# Patient Record
Sex: FEMALE | Race: BLACK OR AFRICAN AMERICAN | Employment: FULL TIME | URBAN - METROPOLITAN AREA
[De-identification: names, ages, dates, MRNs, and addresses within clinical notes are randomized per-mention and may not be internally consistent; named-entity substitution may affect disease eponyms.]

---

## 2020-05-29 ENCOUNTER — TELEPHONE (OUTPATIENT)
Dept: OBGYN CLINIC | Age: 35
End: 2020-05-29

## 2020-05-29 NOTE — TELEPHONE ENCOUNTER
I would probably advise an US no earlier than 1-2 weeks from now based on her possible LMP of 4/23, if we do it too early we wont be able to see the baby/heartbeat. If you find me an ultrasound Ill make it work on my schedule. You can use the late am work in spots    Or add on to the end of the day on Tues/Thur 240-340    Also if she wants to just do a pregnancy consult to discuss her risk factors/labs (no US)   6/1 840 arrive 825/15min early for paperwork if new  or 1110    Let me know if you need help finding a better time for her.

## 2020-05-29 NOTE — TELEPHONE ENCOUNTER
This message is coming from a nurse triage call that was received regarding a new patient that is set up to see TP on 6/23/20 for NOB. She has seen her PCP and had some blood work done. She has had a history of some vitamin deficiency due to surgery that she had similar to a gastric bypass a while back. She is not having any pregnancy issues such as cramping or bleeding. She just does not know exactly how far along she is. She said either her last period was on 3/2/20 or 4/23/20. She does not know exactly because the one on 3-2-20 was very irregular. She has slight nausea and dizziness at times but is tolerating well. She has had to utilize MFM in the past according to patient due pregnancy issues. She does not want to wait until 6-23-20 for her NOB and wants to be seen sooner. Please advise your recommendations, as nothing she verbalized  has indicated an urgency. Patient has been advised that TP is out of office today and may not get message back until next week and she is good with this.

## 2020-06-01 ENCOUNTER — OFFICE VISIT (OUTPATIENT)
Dept: OBGYN CLINIC | Age: 35
End: 2020-06-01

## 2020-06-01 VITALS
WEIGHT: 202.5 LBS | HEIGHT: 66 IN | SYSTOLIC BLOOD PRESSURE: 100 MMHG | BODY MASS INDEX: 32.54 KG/M2 | DIASTOLIC BLOOD PRESSURE: 68 MMHG

## 2020-06-01 DIAGNOSIS — Z30.09 FAMILY PLANNING: ICD-10-CM

## 2020-06-01 DIAGNOSIS — E55.9 VITAMIN D DEFICIENCY: ICD-10-CM

## 2020-06-01 DIAGNOSIS — N92.6 MISSED MENSES: Primary | ICD-10-CM

## 2020-06-01 DIAGNOSIS — Z98.891 H/O: C-SECTION: ICD-10-CM

## 2020-06-01 LAB
HCG URINE, QL. (POC): POSITIVE
VALID INTERNAL CONTROL?: YES

## 2020-06-01 RX ORDER — ASPIRIN 325 MG
TABLET, DELAYED RELEASE (ENTERIC COATED) ORAL
COMMUNITY

## 2020-06-01 RX ORDER — CALCIUM CARBONATE/VITAMIN D3 600 MG-125
TABLET ORAL
COMMUNITY

## 2020-06-01 RX ORDER — CHOLECALCIFEROL (VITAMIN D3) 50 MCG
CAPSULE ORAL
COMMUNITY

## 2020-06-01 NOTE — PROGRESS NOTES
[address] [facility]  [email or web address]  [phone]    Alfonzo Apple MD, [address] [facility]       OB/GYN Problem visit    Chief Complaint:   Chief Complaint   Patient presents with    Missed Menses     Tested positive for pregnancy       Last or next WWE is: Due now    History of Present Illness: This is a new problem being evaluated by this provider. The patient is a 37 y.o.  female who reports having tested positive for pregnancy 2 weeks ago. She reports the symptoms are unchanged. Aggravating factors include none. Alleviating factors include none. Planning on seeing nutritionist.       She does have other concerns. Patient reports she has had issues with vitamin D and vitamin A deficiency. LMP: Patient's last menstrual period was 2020 (exact date). PFSH:  Past Medical History:   Diagnosis Date    Anemia     Anemia     Gallstones     Hypoglycemia     Migraines     Pap smear for cervical cancer screening     Vitamin A deficiency     Vitamin D deficiency      Past Surgical History:   Procedure Laterality Date    HX  SECTION  2016;07/15/2009    HX HERNIA REPAIR      IR CHOLECYSTOSTOMY PERCUTANEOUS      [facility]     Family History   Problem Relation Age of Onset   [address] [facility] Hypertension Mother    [address] [facility] MS Mother     Stroke Mother     Hypertension Father     COPD Father     Diabetes Father     Breast Cancer Paternal Aunt      Social History     Tobacco Use    Smoking status: Never Smoker    Smokeless tobacco: Never Used   Substance Use Topics    Alcohol use: Not Currently    Drug use: Never     Allergies   Allergen Reactions    Tamiflu [Oseltamivir] Hives     Current Outpatient Medications   Medication Sig    cholecalciferol (VITAMIN D3) (50,000 UNITS /1250 MCG) capsule Take  by mouth every seven (7) days.  MULTIVITAMIN PO Take  by mouth.     calcium-cholecalciferol, d3, (CALCIUM 600 + D) 600-125 mg-unit tab Take by mouth.  omega 3-dha-epa-fish oil (Fish Oil) 100-160-1,000 mg cap Take  by mouth. No current facility-administered medications for this visit. Review of Systems:  History obtained from the patient  Constitutional: negative for fevers, chills and weight loss  ENT ROS: negative for - hearing change, oral lesions or visual changes  Respiratory: negative for cough, wheezing or dyspnea on exertion  Cardiovascular: negative for chest pain, irregular heart beats, exertional chest pressure/discomfort  Gastrointestinal: negative for dysphagia, nausea and vomiting  Genito-Urinary ROS:  see HPI  Inteument/breast: negative for rash, breast lump and nipple discharge  Musculoskeletal:negative for stiff joints, neck pain and muscle weakness  Endocrine ROS: negative for - breast changes, galactorrhea or temperature intolerance  Hematological and Lymphatic ROS: negative for - blood clots, bruising or swollen lymph nodes    Physical Exam:  Visit Vitals  /68 (BP 1 Location: Left arm, BP Patient Position: Sitting)   Ht 5' 6\" (1.676 m)   Wt 202 lb 8 oz (91.9 kg)   BMI 32.68 kg/m²       GENERAL: alert, well appearing, and in no distress  HEAD: normocephalic, atraumatic. \\  NEURO: alert, oriented, normal speech    Assessment:  Encounter Diagnoses   Name Primary?  Missed menses Yes    Family planning     Vitamin D deficiency     H/O:         Plan:  The patient is advised that she should contact the office if she does not note improvement or if symptoms recur  Recommend follow up with PCP for non-gynecologic complaints and chronic medical problems.     She should contact our office with any questions or concerns  Get records, CS/ prior ob records  Rec PNV  Had labs with PCP vit/a/e/hcg  Consider earlier EOB us if indicated  Agree with planned nutrition consult  We discussed risks of AMA: including but not limited to the patient's risk for adverse outcome, including miscarriage, pregnancy loss, stillbirth, fetal chromosomal and anatomic anomalies,  delivery, low birth weight, intrauterine growth restriction, placenta previa, gestational diabetes, preeclampsia, and  delivery. I recommended a genetics and MFM consult to review genetic and OB risks in detail. We reviewed the JAYA delivery consent and it was signed by the patient. We discussed risks and alternatives to trial of labor after  section, including elective repeat  section, ERCD. We also discussed potential risks of pitocin use, including but not limited to uterine rupture. We discussed bleeding, infection, anesthesia risks, damage to internal organs; bladder/bowel/other internal organs, scarring, and additional procedures, if needed. We discussed that blood transfusion may be required in life threatening situations and discussed alternatives. We reviewed potential risks of blood transfusion: including transfusion reactions and infections. The patient consents to transfusion, if necessary. We reviewed success and failure rates of TOLAC, incidence of uterine rupture and factors that increase risks, future reproductive plans and risks of multiple  deliveries, including but not limited to placenta creta. The maternal and  risks with uterine rupture were reviewed. We discussed  risks associated with TOLAC and ERCD. The risk of peripartum hysterectomy because of uterine rupture or placenta creta, in future pregnancies were reviewed. Would need to review op notes to consider GULSHAN and rec avoid IOL     I spent 25 minutes of face to face time counseling and discussing pregnancy/prenatal care medical risk factors with the patient. More than 50 % of her visit was spent performing counseling.          Orders Placed This Encounter    AMB POC URINE PREGNANCY TEST, VISUAL COLOR COMPARISON       Results for orders placed or performed in visit on 20   AMB POC URINE PREGNANCY TEST, VISUAL COLOR COMPARISON   Result Value Ref Range    VALID INTERNAL CONTROL POC Yes     HCG urine, Ql. (POC) Positive Negative

## 2020-06-01 NOTE — PATIENT INSTRUCTIONS
Learning About Pregnancy Your Care Instructions Your health in the early weeks of your pregnancy is particularly important for your baby's health. Take good care of yourself. Anything you do that harms your body can also harm your baby. Make sure to go to all of your doctor appointments. Regular checkups will help keep you and your baby healthy. How can you care for yourself at home? Diet · Eat a balanced diet. Make sure your diet includes plenty of beans, peas, and leafy green vegetables. · Do not skip meals or go for many hours without eating. If you are nauseated, try to eat a small, healthy snack every 2 to 3 hours. · Do not eat fish that has a high level of mercury, such as shark, swordfish, or mackerel. Do not eat more than one can of tuna each week. · Drink plenty of fluids, enough so that your urine is light yellow or clear like water. If you have kidney, heart, or liver disease and have to limit fluids, talk with your doctor before you increase the amount of fluids you drink. · Cut down on caffeine, such as coffee, tea, and cola. · Do not drink alcohol, such as beer, wine, or hard liquor. · Take a multivitamin that contains at least 400 micrograms (mcg) of folic acid to help prevent birth defects. Fortified cereal and whole wheat bread are good additional sources of folic acid. · Increase the calcium in your diet. Try to drink a quart of skim milk each day. You may also take calcium supplements and choose foods such as cheese and yogurt. Lifestyle · Make sure you go to your follow-up appointments. · Get plenty of rest. You may be unusually tired while you are pregnant. · Get at least 30 minutes of exercise on most days of the week. Walking is a good choice. If you have not exercised in the past, start out slowly. Take several short walks each day. · Do not smoke.  If you need help quitting, talk to your doctor about stop-smoking programs. These can increase your chances of quitting for good. · Do not touch cat feces or litter boxes. Also, wash your hands after you handle raw meat, and fully cook all meat before you eat it. Wear gloves when you work in the yard or garden, and wash your hands well when you are done. Cat feces, raw or undercooked meat, and contaminated dirt can cause an infection that may harm your baby or lead to a miscarriage. · Do not use saunas or hot tubs. Raising your body temperature may harm your baby. · Avoid chemical fumes, paint fumes, or poisons. · Do not use illegal drugs or alcohol. Medicines · Review all of your medicines with your doctor. Some of your routine medicines may need to be changed to protect your baby. · Use acetaminophen (Tylenol) to relieve minor problems, such as a mild headache or backache or a mild fever with cold symptoms. Do not use nonsteroidal anti-inflammatory drugs (NSAIDs), such as ibuprofen (Advil, Motrin) or naproxen (Aleve), unless your doctor says it is okay. · Do not take two or more pain medicines at the same time unless the doctor told you to. Many pain medicines have acetaminophen, which is Tylenol. Too much acetaminophen (Tylenol) can be harmful. · Take your medicines exactly as prescribed. Call your doctor if you think you are having a problem with your medicine. To manage morning sickness · If you feel sick when you first wake up, try eating a small snack (such as crackers) before you get out of bed. Allow some time to digest the snack, and then get out of bed slowly. · Do not skip meals or go for long periods without eating. An empty stomach can make nausea worse. · Eat small, frequent meals instead of three large meals each day. · Drink plenty of fluids. Sports drinks, such as Gatorade or Powerade, are good choices. · Eat foods that are high in protein but low in fat.  
· If you are taking iron supplements, ask your doctor if they are necessary. Iron can make nausea worse. · Avoid any smells, such as coffee, that make you feel sick. · Get lots of rest. Morning sickness may be worse when you are tired. Follow-up care is a key part of your treatment and safety. Be sure to make and go to all appointments, and call your doctor if you are having problems. It's also a good idea to know your test results and keep a list of the medicines you take. Where can you learn more? Go to http://kourtney-tamy.info/ Enter A208 in the search box to learn more about \"Learning About Pregnancy. \" Current as of: February 11, 2020               Content Version: 12.5 © 4183-5226 Healthwise, Incorporated. Care instructions adapted under license by [employer] (which disclaims liability or warranty for this information). If you have questions about a medical condition or this instruction, always ask your healthcare professional. [address] any warranty or liability for your use of this information.

## 2020-06-05 ENCOUNTER — TELEPHONE (OUTPATIENT)
Dept: OBGYN CLINIC | Age: 35
End: 2020-06-05

## 2020-06-05 PROBLEM — Z98.891 H/O: C-SECTION: Status: ACTIVE | Noted: 2020-06-05

## 2020-06-05 NOTE — TELEPHONE ENCOUNTER
Labs look ok except vit D level is low: reccontinue supplementation I would advise at least 2000 IU per day and keep planned nutrition consult. I think that was her plan    No anemia, iron level ok.     Avi Gallardo MD

## 2020-06-05 NOTE — TELEPHONE ENCOUNTER
Patient is calling saying that she saw TP on 6/1/20. She has a history of high risk pregnancy. TP was requesting records/labs from her PCP [address])- records are scanned in media. Patient wants to know your thoughts on the labs. She has a history of anemia. She said she has NOB appointment with [address] [place] [address],[address] on 6/23/20  9:30 am US and visit with TP after at 10:00 am.  Is this what you wanted her to do or does she need to see MFM earlier?

## 2020-06-17 ENCOUNTER — PATIENT MESSAGE (OUTPATIENT)
Dept: OBGYN CLINIC | Age: 35
End: 2020-06-17

## 2020-06-17 RX ORDER — DOXYLAMINE SUCCINATE AND PYRIDOXINE HYDROCHLORIDE 20; 20 MG/1; MG/1
1 TABLET, EXTENDED RELEASE ORAL DAILY
Qty: 60 TAB | Refills: 3 | Status: SHIPPED | OUTPATIENT
Start: 2020-06-17 | End: 2020-06-18

## 2020-06-17 NOTE — TELEPHONE ENCOUNTER
From: Carl Orellana  To: aHlley Martell MD  Sent: 6/17/2020 8:17 AM EDT  Subject: Non-Urgent Medical Question    Good Morning,     I am still having issues with nausea and just not feeling well over all. I have tried the B6 and Unisom. I have also tried sandhya (which makes me gag now). I suppose it is just part of pregnancy. I am only concerned now because I also have diarrhea and wasn't sure if this was safe or not. Please advise. Thank you for your time.      Sincerely,   Johnathan Dent

## 2020-06-18 ENCOUNTER — PATIENT MESSAGE (OUTPATIENT)
Dept: OBGYN CLINIC | Age: 35
End: 2020-06-18

## 2020-06-19 RX ORDER — DOXYLAMINE SUCCINATE AND PYRIDOXINE HYDROCHLORIDE, DELAYED RELEASE TABLETS 10 MG/10 MG 10; 10 MG/1; MG/1
2 TABLET, DELAYED RELEASE ORAL
Qty: 100 TAB | Refills: 0 | Status: SHIPPED | OUTPATIENT
Start: 2020-06-19 | End: 2020-07-02

## 2020-06-19 NOTE — TELEPHONE ENCOUNTER
From: Nemo Lomas  To: Sarah Tyler MD  Sent: 6/18/2020 10:59 AM EDT  Subject: Prescription Question    Hi Dr. Panda Pittman,    My insurance won't pay for the prescription you sent. Is there an alternative that can be sent? Thanks in advance!

## 2020-06-22 NOTE — PATIENT INSTRUCTIONS
Learning About When to Call Your Doctor During Pregnancy (Up to 20 Weeks)  Your Care Instructions     It's common to have concerns about what might be a problem during pregnancy. Although most pregnant women don't have any serious problems, it's important to know when to call your doctor if you have certain symptoms. These are general suggestions. Your doctor may give you some more information about when to call. When to call your doctor (up to 20 weeks)  [id number] anytime you think you may need emergency care. For example, call if:  · You passed out (lost consciousness). Call your doctor now or seek immediate medical care if:  · You have a fever. · You have vaginal bleeding. · You are dizzy or lightheaded, or you feel like you may faint. · You have symptoms of a urinary tract infection. These may include:  ? Pain or burning when you urinate. ? A frequent need to urinate without being able to pass much urine. ? Pain in the flank, which is just below the rib cage and above the waist on either side of the back. ? Blood in your urine. · You have belly pain. · You think you are having contractions. · You have a sudden release of fluid from your vagina. Watch closely for changes in your health, and be sure to contact your doctor if:  · You have vaginal discharge that smells bad. · You have other concerns about your pregnancy. Follow-up care is a key part of your treatment and safety. Be sure to make and go to all appointments, and call your doctor if you are having problems. It's also a good idea to know your test results and keep a list of the medicines you take. Where can you learn more? Go to http://kourtney-tamy.info/  Enter B724 in the search box to learn more about \"Learning About When to Call Your Doctor During Pregnancy (Up to 20 Weeks). \"  Current as of: February 11, 2020               Content Version: 12.5  © 1972-7443 Healthwise, Incorporated.    Care instructions adapted under license by [employer] (which disclaims liability or warranty for this information). If you have questions about a medical condition or this instruction, always ask your healthcare professional. [address] any warranty or liability for your use of this information. Weeks 14 to 18 of Your Pregnancy: Care Instructions  Your Care Instructions    During this time, you may start to \"show,\" so that you look pregnant to people around you. You may also notice some changes in your skin, such as itchy spots on your palms or acne on your face. Your baby is now able to pass urine, and your baby's first stool (meconium) is starting to collect in his or her intestines. Hair is also beginning to grow on your baby's head. At your next visit, between weeks 18 and 20, your doctor may do an ultrasound test. The test allows your doctor to check for certain problems. Your doctor can also tell the sex of your baby. This is a good time to think about whether you want to know whether your baby is a boy or a girl. Talk to your doctor about getting a flu shot to help keep you healthy during your pregnancy. As your pregnancy moves along, it is common to worry or feel anxious. Your body is changing a lot. And you are thinking about giving birth, the health of your baby, and becoming a parent. You can learn to cope with any anxiety and stress you feel. Follow-up care is a key part of your treatment and safety. Be sure to make and go to all appointments, and call your doctor if you are having problems. It's also a good idea to know your test results and keep a list of the medicines you take. How can you care for yourself at home?   Reduce stress    · Ask for help with cooking and housekeeping.     · Figure out who or what causes your stress. Avoid these people or situations as much as possible.     · Relax every day. Taking 10- to 15-minute breaks can make a big difference.  Take a walk, listen to music, or take a warm bath.     · Learn relaxation techniques at prenatal or yoga class. Or buy a relaxation tape.     · List your fears about having a baby and becoming a parent. Share the list with someone you trust. Decide which worries are really small, and try to let them go. Exercise    · If you did not exercise much before pregnancy, start slowly. Walking is best. [employer], and do a little more every day.     · Brisk walking, easy jogging, low-impact aerobics, water aerobics, and yoga are good choices. Some sports, such as scuba diving, horseback riding, downhill skiing, gymnastics, and water skiing, are not a good idea.     · Try to do at least 2½ hours a week of moderate exercise, such as a fast walk. One way to do this is to be active 30 minutes a day, at least 5 days a week. It's fine to be active in blocks of 10 minutes or more throughout your day and week.     · Wear loose clothing. And wear shoes and a bra that provide good support.     · Warm up and cool down to start and finish your exercise.     · If you want to use weights, be sure to use light weights. They reduce stress on your joints.    Stay at the best weight for you    · Experts recommend that you gain about 1 pound a month during the first 3 months of your pregnancy.     · Experts recommend that you gain about 1 pound a week during your last 6 months of pregnancy, for a total weight gain of 25 to 35 pounds.     · If you are underweight, you will need to gain more weight (about 28 to 40 pounds).     · If you are overweight, you may not need to gain as much weight (about 15 to 25 pounds).     · If you are gaining weight too fast, use common sense. Exercise every day, and limit sweets, fast foods, and fats. Choose lean meats, fruits, and vegetables.     · If you are having twins or more, your doctor may refer you to a dietitian. Where can you learn more?   Go to http://kourtney-tamy.info/  Enter I453 in the search box to learn more about \"Weeks 14 to 18 of Your Pregnancy: Care Instructions. \"  Current as of: May 29, 2019Content Version: 12.4  © 1187-8018 Healthwise, Incorporated. Care instructions adapted under license by [employer] (which disclaims liability or warranty for this information). If you have questions about a medical condition or this instruction, always ask your healthcare professional. [place] [address] any warranty or liability for your use of this information.

## 2020-06-23 ENCOUNTER — HOSPITAL ENCOUNTER (OUTPATIENT)
Dept: LAB | Age: 35
Discharge: HOME | End: 2020-06-23

## 2020-06-23 ENCOUNTER — OFFICE VISIT (OUTPATIENT)
Dept: OBGYN CLINIC | Age: 35
End: 2020-06-23

## 2020-06-23 VITALS
WEIGHT: 203 LBS | BODY MASS INDEX: 32.62 KG/M2 | HEIGHT: 66 IN | SYSTOLIC BLOOD PRESSURE: 100 MMHG | DIASTOLIC BLOOD PRESSURE: 64 MMHG

## 2020-06-23 DIAGNOSIS — O21.9 NAUSEA AND VOMITING IN PREGNANCY: ICD-10-CM

## 2020-06-23 DIAGNOSIS — O09.529 ANTEPARTUM MULTIGRAVIDA OF ADVANCED MATERNAL AGE: ICD-10-CM

## 2020-06-23 DIAGNOSIS — Z34.80 PRENATAL CARE OF MULTIGRAVIDA, ANTEPARTUM: ICD-10-CM

## 2020-06-23 DIAGNOSIS — Z34.80 PRENATAL CARE OF MULTIGRAVIDA, ANTEPARTUM: Primary | ICD-10-CM

## 2020-06-23 LAB
ERYTHROCYTE [DISTWIDTH] IN BLOOD BY AUTOMATED COUNT: 13.1 % (ref 11.5–14.5)
HBSAG, EXTERNAL: NEGATIVE
HBV SURFACE AG SER QL: <0.1 INDEX
HBV SURFACE AG SER QL: NEGATIVE
HCT VFR BLD AUTO: 36.5 % (ref 35–47)
HCT, EXTERNAL: 36.5
HGB BLD-MCNC: 11.3 G/DL (ref 11.5–16)
HGB EVAL, EXTERNAL: NEGATIVE
HGB, EXTERNAL: 11.3
HIV 1+2 AB+HIV1 P24 AG SERPL QL IA: NONREACTIVE
HIV12 RESULT COMMENT, HHIVC: NORMAL
MCH RBC QN AUTO: 27.8 PG (ref 26–34)
MCHC RBC AUTO-ENTMCNC: 31 G/DL (ref 30–36.5)
MCV RBC AUTO: 89.9 FL (ref 80–99)
NRBC # BLD: 0 K/UL (ref 0–0.01)
NRBC BLD-RTO: 0 PER 100 WBC
PAP SMEAR, EXTERNAL: NEGATIVE
PLATELET # BLD AUTO: 160 K/UL (ref 150–400)
PLATELET CNT,   EXTERNAL: 160
RBC # BLD AUTO: 4.06 M/UL (ref 3.8–5.2)
RPR, EXTERNAL: NON REACTIVE
RUBELLA, EXTERNAL: REACTIVE
RUBV IGG SER-IMP: REACTIVE
RUBV IGG SERPL IA-ACNC: 151.9 IU/ML
WBC # BLD AUTO: 7.7 K/UL (ref 3.6–11)

## 2020-06-23 NOTE — PROGRESS NOTES
[facility]  [email or web address]  [phone]    Cheryl Vo MD, [address] [facility]     Chief complaint:  Irregular cycles  Last cycle; Patient's last menstrual period was 2020 (exact date). This is a new concern and an evaluation is planned. Current pregnancy history:  Kristel Blum is a , 37 y.o. female [address]   She presents for the evaluation of irregular menses and a positive pregnancy test.    LMP history:  Patient's last menstrual period was 2020 (exact date). .  The date of the beginning of her last menstrual period is certain. Her menses are regular. Her cycles occur about every 4 weeks. A urine pregnancy test was positive about 4 weeks ago. She was not using contraception at the estimated time of conception. Based on her LMP, her EGA is 8 weeks,2 days with and EDC of 2021. Ultrasound data:  She had an ultrasound today which revealed a viable cai pregnancy with a gestational age of 10 weeks and 0 days giving an EDC of 2021. Pregnancy symptoms:  She reports fatigue, breast tenderness, nausea, and vomiting. She denies vaginal bleeding and pelvic pain. Since she found out she is pregnant, she has there was no change in patient's weight. She reports her prepregnancy weight as 203 pounds. Relevant past pregnancy history:  She has the following pregnancy history:none. She does not have a history of  delivery. She does have a history of a prior  section. Relevant past medical history:(relevant to this pregnancy):   Vitamin A deficiency; night blindness and hypoglycemia     Pap smear history:  Last pap smear: Not on file  Results: results have not been obtained    Occupational history  Her occupation is: .    Substance history:   She does not report current tobacco use. She does not report current alcohol use. She does not report current drug use.     Exposure history: There are not indoor cat(s) in the home. The patient was instructed not to change cat litter boxes during pregnancy. She does report close contact with children on a regular basis. She has chicken pox or the vaccine in the past.   Patient does not report issues with domestic violence. Genetic Screening/Teratology Counseling:   (Includes patient, baby's father, or anyone in either family with:)  3.  Patient's age >/= 28 at EDC?--34 y.o.        FOB age: 42years old. 2.  Thalassemia ([facility], [facility], [address] [facility], or  background): MCV<80?--yes and ? (FOB)  3. Neural tube defect (meningomyelocele, spina bifida, anencephaly)? --arnold chiari malformation   4. Congenital heart defect?--no  5. Down syndrome?--no  6. Hal-Sachs ([address] [place] [address])? --no  7. Canavan's Disease?--no  8. Familial Dysautonomia?--no   9. Sickle cell disease or trait ()? --no   Has she been tested for sickle trait: Unknown  10. Hemophilia or other blood disorders?--no  11. Muscular dystrophy?--no  12. Cystic fibrosis? --no  13. Eek's Chorea?--no  14. Mental retardation/autism (if yes was person tested for Fragile X)? -autism  13. Other inherited genetic or chromosomal disorder?- no  16. Maternal metabolic disorder (DM, PKU, etc)? --no  17. Patient or FOB with a child with a birth defect not listed above?--no  17a. Patient or FOB with a birth defect themselves?--no  25. Recurrent pregnancy loss, or stillbirth?--no  19. Any medications since LMP other than prenatal vitamins (include vitamins, supplements, OTC meds, drugs, alcohol)? --     Current Outpatient Medications:     cholecalciferol (VITAMIN D3) (50,000 UNITS /1250 MCG) capsule, Take  by mouth every seven (7) days. , Disp: , Rfl:     MULTIVITAMIN PO, Take  by mouth., Disp: , Rfl:     calcium-cholecalciferol, d3, (CALCIUM 600 + D) 600-125 mg-unit tab, Take  by mouth., Disp: , Rfl:     omega 3-dha-epa-fish oil (Fish Oil) 100-160-1,000 mg cap, Take  by mouth., Disp: , Rfl:     doxylamine-pyridoxine, vit B6, (Diclegis) 10-10 mg TbEC DR tablet, Take 2 Tabs by mouth nightly. add one in am after 3d prn, add one in pm after 6d prn. Max 4/day, Disp: 100 Tab, Rfl: 0    20. Any other genetic/environmental exposure to discuss?--no. Infection History:  1. Lives with someone with TB or TB exposed?--no  2. Patient or partner has history of genital herpes?--no  3. Rash or viral illness since LMP?--no  4. History of STD (GC, CT, HPV, syphilis, HIV)? --no  5. Have you received a flu vaccine for the most recent flu season? -- no  6.   Have you or your sexual partner(s) travelled to a [facility] area in the last 3 months? -- no    Past Medical History:   Diagnosis Date    Anemia     Anemia     Gallstones     Hypoglycemia     Migraines     Pap smear for cervical cancer screening     Vitamin A deficiency     Vitamin D deficiency      Past Surgical History:   Procedure Laterality Date    HX  SECTION  2016;07/15/2009    HX HERNIA REPAIR  2014    IR CHOLECYSTOSTOMY PERCUTANEOUS  2019    [facility]     Social History     Occupational History    Not on file   Tobacco Use    Smoking status: Never Smoker    Smokeless tobacco: Never Used   Substance and Sexual Activity    Alcohol use: Not Currently    Drug use: Never    Sexual activity: Yes     Partners: Male     Birth control/protection: None     Family History   Problem Relation Age of Onset    Hypertension Mother     MS Mother     Stroke Mother     Hypertension Father     COPD Father     Diabetes Father     Breast Cancer Paternal Aunt      OB History    Para Term  AB Living   3 2 2     2   SAB TAB Ectopic Molar Multiple Live Births             2      # Outcome Date GA Lbr Cortez/2nd Weight Sex Delivery Anes PTL Lv   3 Current            2 Term 16 39w2d  5 lb 15 oz (2.693 kg) M CS-Unspec Local  JUANCARLOS   1 Term 07/15/09 M CS-LTranv Local  JUANCARLOS      Obstetric Comments   P1:?NRFS,    P2: repeat CS, hemorrhoid, labor   P2 Night blindness (pt)  third trimester   Smaller baby   Labile BS   ? Vit A Vit D deficiency   T no shunt   Child w many illness in first year of life. chiari malformation     Allergies   Allergen Reactions    Tamiflu [Oseltamivir] Hives     Prior to Admission medications    Medication Sig Start Date End Date Taking? Authorizing Provider   cholecalciferol (VITAMIN D3) (50,000 UNITS /1250 MCG) capsule Take  by mouth every seven (7) days. Yes Provider, Historical   MULTIVITAMIN PO Take  by mouth. Yes Provider, Historical   calcium-cholecalciferol, d3, (CALCIUM 600 + D) 600-125 mg-unit tab Take  by mouth. Yes Provider, Historical   omega 3-dha-epa-fish oil (Fish Oil) 100-160-1,000 mg cap Take  by mouth. Yes Provider, Historical   doxylamine-pyridoxine, vit B6, (Diclegis) 10-10 mg TbEC DR tablet Take 2 Tabs by mouth nightly. add one in am after 3d prn, add one in pm after 6d prn.  Max 4/day 6/19/20   Kiran Story MD        Review of Systems - History obtained from the patient  Constitutional: negative for weight loss, fever, night sweats  HEENT: negative for hearing loss, earache, congestion, snoring, sorethroat  CV: negative for chest pain, palpitations, edema  Resp: negative for cough, shortness of breath, wheezing  GI: negative for change in bowel habits, abdominal pain, black or bloody stools  : negative for frequency, dysuria, hematuria, vaginal discharge  MSK: negative for back pain, joint pain, muscle pain  Breast: negative for breast lumps, nipple discharge, galactorrhea  Skin :negative for itching, rash, hives  Neuro: negative for dizziness, headache, confusion, weakness  Psych: negative for anxiety, depression, change in mood  Heme/lymph: negative for bleeding, bruising, pallor    Objective:  Visit Vitals  /64 (BP 1 Location: Left arm, BP Patient Position: Sitting)   Ht 5' 6\" (1.676 m) Wt 203 lb (92.1 kg)   LMP 04/23/2020 (Exact Date)   BMI 32.77 kg/m²       Physical Exam:   Constitutional  · Appearance: well-nourished, well developed, alert, in no acute distress    HENT  · Head  · Face: appears normal  · Eyes: appear normal  · Ears: normal  · Mouth: normal  · Lips: no lesions    Neck  · Inspection/Palpation: normal appearance, no masses or tenderness  · Lymph Nodes: no lymphadenopathy present  · Thyroid: gland size normal, nontender, no nodules or masses present on palpation    Chest  · Respiratory Effort: breathing unlabored  · Auscultation: normal breath sounds    Cardiovascular  · Heart:  · Auscultation: regular rate and rhythm without murmur    Breasts  · Inspection of Breasts: breasts symmetrical, no skin changes, no discharge present, nipple appearance normal, no skin retraction present  · Palpation of Breasts and Axillae: no masses present on palpation, no breast tenderness  · Axillary Lymph Nodes: no lymphadenopathy present    Gastrointestinal  · Abdominal Examination: abdomen non-tender to palpation, normal bowel sounds, no masses present  · Liver and spleen: no hepatomegaly present, spleen not palpable  · Hernias: no hernias identified    Genitourinary  · External Genitalia: normal appearance for age, no discharge present, no tenderness present, no inflammatory lesions present, no masses present, no atrophy present  · Vagina: normal vaginal vault without central or paravaginal defects, no discharge present, no inflammatory lesions present, no masses present  · Bladder: non-tender to palpation  · Urethra: appears normal  · Cervix: normal appearing with discharge or lesions, os closed  · Uterus: enlarged, normal shape, soft  · Adnexa: no adnexal tenderness present, no adnexal masses present  · Perineum: perineum within normal limits, no evidence of trauma, no rashes or skin lesions present  · Anus: anus within normal limits, no hemorrhoids present  · Inguinal Lymph Nodes: no lymphadenopathy present    Skin  · General Inspection: no rash, no lesions identified    Neurologic/Psychiatric  · Mental Status:  · Orientation: grossly oriented to person, place and time  · Mood and Affect: mood normal, affect appropriate    Assessment:   Irregular cycles  Encounter Diagnoses   Name Primary?  Prenatal care of multigravida, antepartum Yes    Antepartum multigravida of advanced maternal age      Due date: LMP    Plan:   We discussed options of genetic screening and diagnostic testing including:  CF testing, CVS, amniocentesis first trimester screening/NT, MSAFP, and NIPT (handout given to patient for review and consent)  She is interested in prenatal genetic testing of her fetus. Plan: NT/NIPS, FS AMA fh chiari malformation. Fh autism  We discussed risks of AMA: including but not limited to the patient's risk for adverse outcome, including miscarriage, pregnancy loss, stillbirth, fetal chromosomal and anatomic anomalies,  delivery, low birth weight, intrauterine growth restriction, placenta previa, gestational diabetes, preeclampsia, and  delivery. I recommended a genetics and MFM consult to review genetic and OB risks in detail. Horizon testing  hgb electro  The course of pregnancy discussed including visit schedule, ultrasounds, lab testing, etc.  Pt advised to avoid alcoholic beverages and illicit/recreational drugs use  Recommend taking prenatal vitamins or folic acid daily with DHA/fish oil. The hospital and practice style discussed with coverage system. We discussed nutrition, toxoplasmosis precautions, sexual activity, exercise, need for influenza vaccine, environmental and work hazards, travel advice, screen for domestic violence, need for seat belts. We discussed seafood, unpasteurized dairy products, deli meat, artificial sweeteners, and caffeine intake. We recommend avoiding chemical and toxin exposures when possible.    Information on prenatal and breastfeeding classes given. Information on circumcision given  Patient encouraged not to smoke. Discussed current prescription drug use. Given medication list.  Discussed the use of over the counter medications and chemicals. She is advised to contact her MD with any questions. Pt understands risk of hemorrhage during pregnancy and post delivery and would accept blood products if necessary in life-threatening emergencies  We discussed signs and symptoms of abnormal pregnancies and miscarriage. Handouts given to pt. Physician review of ultrasound performed by technician  TA ULTRASOUND PERFORMED. A SINGLE VIABLE 9W0D IUP IS SEEN WITH NORMAL CARDIAC RHYTHM. GESTATIONAL AGE BASED ON TODAYS US.  A NORMAL APPEARING YOLK [address] IS SEEN. RIGHT & LEFT OVARIES APPEAR WITHIN NORMAL LIMITS. A CL CYST IS SEEN ON THE LEFT OVARY. NO FREE FLUID SEEN IN THE CDS. Today's ultrasound report and images were reviewed and discussed with the patient. Please see images and imaging report entered by technician in PACS for more detail and progress note and diagnosis entered by MD.    Bonnie Chand MD    Orders Placed This Encounter    CULTURE, URINE    HEP B SURFACE AG    HIV SCREEN, [facility]. W/REFLEX CONFIRM    CBC W/O DIFF    RUBELLA AB, IGG    RPR    MISC.  LAB TEST    HEMOGLOBIN FRACTIONATION    CYSTIC FIBROSIS MUTATIONS    SMN1 COPY NUMBER ANALYSIS    REFERRAL TO MATERNAL FETAL MEDICINE    TYPE & SCREEN    PAP IG, CT-NG, HPV 16&18,45([id number], [id number]) ([facility])

## 2020-06-24 LAB
DEPRECATED HGB OTHER BLD-IMP: 0 %
HGB A MFR BLD: 97.7 % (ref 96.4–98.8)
HGB A2 MFR BLD COLUMN CHROM: 2.3 % (ref 1.8–3.2)
HGB C MFR BLD: 0 %
HGB F MFR BLD: 0 % (ref 0–2)
HGB FRACT BLD-IMP: NORMAL
HGB S BLD QL SOLY: NEGATIVE
HGB S MFR BLD: 0 %
RPR SER QL: NONREACTIVE

## 2020-06-25 LAB — BACTERIA UR CULT: NORMAL

## 2020-06-26 ENCOUNTER — HOSPITAL ENCOUNTER (OUTPATIENT)
Dept: LAB | Age: 35
Discharge: HOME | End: 2020-06-26

## 2020-06-26 DIAGNOSIS — O09.529 ANTEPARTUM MULTIGRAVIDA OF ADVANCED MATERNAL AGE: ICD-10-CM

## 2020-06-26 DIAGNOSIS — Z34.80 PRENATAL CARE OF MULTIGRAVIDA, ANTEPARTUM: ICD-10-CM

## 2020-06-26 LAB
ABO + RH BLD: NORMAL
BLOOD BANK CMNT PATIENT-IMP: NORMAL
BLOOD GROUP ANTIBODIES SERPL: NORMAL
SPECIMEN EXP DATE BLD: NORMAL

## 2020-07-01 ENCOUNTER — TELEPHONE (OUTPATIENT)
Dept: OBGYN CLINIC | Age: 35
End: 2020-07-01

## 2020-07-01 NOTE — PATIENT INSTRUCTIONS
Nutrition During Pregnancy: Care Instructions  Your Care Instructions     Healthy eating when you are pregnant is important for you and your baby. It can help you feel well and have a successful pregnancy and delivery. During pregnancy your nutrition needs increase. Even if you have excellent eating habits, your doctor may recommend a multivitamin to make sure you get enough iron and folic acid. Many pregnant women wonder how much weight they should gain. In general, women who were at a healthy weight before they became pregnant should gain between 25 and 35 pounds. Women who were overweight before pregnancy are usually advised to gain 15 to 25 pounds. Women who were underweight before pregnancy are usually advised to gain 28 to 40 pounds. Your doctor will work with you to set a weight goal that is right for you. Gaining a healthy amount of weight helps you have a healthy baby. Follow-up care is a key part of your treatment and safety. Be sure to make and go to all appointments, and call your doctor if you are having problems. It's also a good idea to know your test results and keep a list of the medicines you take. How can you care for yourself at home? · Eat plenty of fruits and vegetables. Include a variety of orange, yellow, and leafy dark-green vegetables every day. · Choose whole-grain bread, cereal, and pasta. Good choices include whole wheat bread, whole wheat pasta, brown rice, and oatmeal.  · Get 4 or more servings of milk and milk products each day. Good choices include nonfat or low-fat milk, yogurt, and cheese. If you cannot eat milk products, you can get calcium from calcium-fortified products such as orange juice, soy milk, and tofu. Other non-milk sources of calcium include leafy green vegetables, such as broccoli, kale, mustard greens, turnip greens, bok manuel, and brussels sprouts. · If you eat meat, pick lower-fat types.  Good choices include lean cuts of meat and chicken or turkey without the skin. · Do not eat shark, swordfish, lyudmila mackerel, or tilefish. They have high levels of mercury, which is dangerous to your baby. You can eat up to 12 ounces a week of fish or shellfish that have low mercury levels. Good choices include shrimp, wild salmon, pollock, and catfish. Do not eat more than 6 ounces of tuna each week. · Heat lunch meats (such as turkey, ham, or bologna) to 165°F before you eat them. This reduces your risk of getting sick from a kind of bacteria that can be found in lunch meats. · Do not eat unpasteurized soft cheeses, such as brie, feta, fresh mozzarella, and blue cheese. They have a bacteria that could harm your baby. · Limit caffeine. If you drink coffee or tea, have no more than 1 cup a day. Caffeine is also found in milad. · Do not drink any alcohol. No amount of alcohol has been found to be safe during pregnancy. · Do not diet or try to lose weight. For example, do not follow a low-carbohydrate diet. If you are overweight at the start of your pregnancy, your doctor will work with you to manage your weight gain. · Tell your doctor about all vitamins and supplements you take. When should you call for help? Watch closely for changes in your health, and be sure to contact your doctor if you have any problems. Where can you learn more? Go to http://k[employer]-tamy.info/  Enter Y785 in the search box to learn more about \"Nutrition During Pregnancy: Care Instructions. \"  Current as of: February 11, 2020               Content Version: 12.5  © 0227-7854 [employer]. Care instructions adapted under license by [employer] (which disclaims liability or warranty for this information). If you have questions about a medical condition or this instruction, always ask your healthcare professional. [address] any warranty or liability for your use of this information.

## 2020-07-01 NOTE — TELEPHONE ENCOUNTER
Message left at 2:18Pm      37year [id number] 9w6d pregnant patient last seen in the office on 2020. Patient left a message about having some pain and nausea. Patient placed on the schedule to be seen at 9:00am tomorrow on 2020    Patient verbalized understanding.

## 2020-07-02 ENCOUNTER — ROUTINE PRENATAL (OUTPATIENT)
Dept: OBGYN CLINIC | Age: 35
End: 2020-07-02

## 2020-07-02 ENCOUNTER — HOSPITAL ENCOUNTER (OUTPATIENT)
Dept: LAB | Age: 35
Discharge: HOME | End: 2020-07-02

## 2020-07-02 VITALS — BODY MASS INDEX: 31.96 KG/M2 | DIASTOLIC BLOOD PRESSURE: 66 MMHG | WEIGHT: 198 LBS | SYSTOLIC BLOOD PRESSURE: 114 MMHG

## 2020-07-02 DIAGNOSIS — M25.559 HIP PAIN: ICD-10-CM

## 2020-07-02 DIAGNOSIS — O21.9 NAUSEA AND VOMITING IN PREGNANCY: ICD-10-CM

## 2020-07-02 DIAGNOSIS — F41.9 ANXIETY DISORDER, UNSPECIFIED TYPE: ICD-10-CM

## 2020-07-02 DIAGNOSIS — O09.529 ANTEPARTUM MULTIGRAVIDA OF ADVANCED MATERNAL AGE: ICD-10-CM

## 2020-07-02 DIAGNOSIS — R11.0 NAUSEA: Primary | ICD-10-CM

## 2020-07-02 DIAGNOSIS — R11.0 NAUSEA: ICD-10-CM

## 2020-07-02 LAB
ALBUMIN SERPL-MCNC: 3.6 G/DL (ref 3.5–5)
ALBUMIN/GLOB SERPL: 1.1 {RATIO} (ref 1.1–2.2)
ALP SERPL-CCNC: 57 U/L (ref 45–117)
ALT SERPL-CCNC: 19 U/L (ref 12–78)
ANION GAP SERPL CALC-SCNC: 8 MMOL/L (ref 5–15)
AST SERPL-CCNC: 12 U/L (ref 15–37)
BILIRUB SERPL-MCNC: 0.9 MG/DL (ref 0.2–1)
BUN SERPL-MCNC: 6 MG/DL (ref 6–20)
BUN/CREAT SERPL: 13 (ref 12–20)
CALCIUM SERPL-MCNC: 8.9 MG/DL (ref 8.5–10.1)
CHLORIDE SERPL-SCNC: 104 MMOL/L (ref 97–108)
CO2 SERPL-SCNC: 24 MMOL/L (ref 21–32)
CREAT SERPL-MCNC: 0.48 MG/DL (ref 0.55–1.02)
ERYTHROCYTE [DISTWIDTH] IN BLOOD BY AUTOMATED COUNT: 13 % (ref 11.5–14.5)
GLOBULIN SER CALC-MCNC: 3.3 G/DL (ref 2–4)
GLUCOSE SERPL-MCNC: 80 MG/DL (ref 65–100)
HCT VFR BLD AUTO: 36.9 % (ref 35–47)
HGB BLD-MCNC: 11.9 G/DL (ref 11.5–16)
MCH RBC QN AUTO: 28.4 PG (ref 26–34)
MCHC RBC AUTO-ENTMCNC: 32.2 G/DL (ref 30–36.5)
MCV RBC AUTO: 88.1 FL (ref 80–99)
NRBC # BLD: 0 K/UL (ref 0–0.01)
NRBC BLD-RTO: 0 PER 100 WBC
PLATELET # BLD AUTO: 169 K/UL (ref 150–400)
POTASSIUM SERPL-SCNC: 4 MMOL/L (ref 3.5–5.1)
PROT SERPL-MCNC: 6.9 G/DL (ref 6.4–8.2)
RBC # BLD AUTO: 4.19 M/UL (ref 3.8–5.2)
SODIUM SERPL-SCNC: 136 MMOL/L (ref 136–145)
WBC # BLD AUTO: 7.5 K/UL (ref 3.6–11)

## 2020-07-02 RX ORDER — PROMETHAZINE HYDROCHLORIDE 25 MG/1
25 TABLET ORAL
Qty: 30 TAB | Refills: 1 | Status: SHIPPED | OUTPATIENT
Start: 2020-07-02 | End: 2020-07-27

## 2020-07-02 RX ORDER — ONDANSETRON 4 MG/1
4 TABLET, ORALLY DISINTEGRATING ORAL
Qty: 30 TAB | Refills: 1 | Status: SHIPPED | OUTPATIENT
Start: 2020-07-02 | End: 2020-07-27

## 2020-07-02 NOTE — PROGRESS NOTES
[facility]  [email or web address]  [phone]    Bucky Baker MD, FACOG       OB/GYN: OB Problem visit    Chief Complaint:   Chief Complaint   Patient presents with    Pregnancy Problem       Patient Active Problem List    Diagnosis    Elderly multigravida     FH: chiari malformation, autism  Ho cs  AMA; NIPS, NT, GC, FS  Ur Cx: Normal - 20  Hemoglobin Frac: Negative - 20      H/O:      Update ob history (anesthesia would be spina or general NOT local: or rarely local) and gestation of first del         History of Present Illness: The patient is a 37 y.o.  female who reports having nausea and vomiting. This is a new problem. This is not a routinely scheduled OB appointment. She reports the symptoms are is unchanged. Aggravating factors include none. Alleviating factors include none. Bonjesta not covered: diclegis: made her feel worse    Co hip pain and low back pain. Co GI problems/ reflux. Had before pregnancy. Co anxiety: took hydroxyzine in past, has counselor. Co decreased energy. She does not have other concerns.     PFSH:  Past Medical History:   Diagnosis Date    Anemia     Anemia 2020    Gallstones 2019    Hypoglycemia     Migraines     Pap smear for cervical cancer screening 2016    Vitamin A deficiency     Vitamin D deficiency 2020     Past Surgical History:   Procedure Laterality Date    HX  SECTION  2016;07/15/2009    HX HERNIA REPAIR  2014    IR CHOLECYSTOSTOMY PERCUTANEOUS  2019    [facility]     Family History   Problem Relation Age of Onset   [facility] Hypertension Mother    [facility] MS Mother     Stroke Mother     Hypertension Father     COPD Father     Diabetes Father     Breast Cancer Paternal Aunt     Other Son         chiari malformation     Social History     Tobacco Use    Smoking status: Never Smoker    Smokeless tobacco: Never Used   Substance Use Topics    Alcohol use: Not Currently    Drug use: Never     Allergies   Allergen Reactions    Tamiflu [Oseltamivir] Hives     Current Outpatient Medications   Medication Sig    promethazine (PHENERGAN) 25 mg tablet Take 1 Tab by mouth every six (6) hours as needed for Nausea.  ondansetron (ZOFRAN ODT) 4 mg disintegrating tablet Take 1 Tab by mouth every eight (8) hours as needed for Nausea or Vomiting. Increase to two tabs for severe nausea.  cholecalciferol (VITAMIN D3) (50,000 UNITS /1250 MCG) capsule Take  by mouth every seven (7) days.  MULTIVITAMIN PO Take  by mouth.  calcium-cholecalciferol, d3, (CALCIUM 600 + D) 600-125 mg-unit tab Take  by mouth.  omega 3-dha-epa-fish oil (Fish Oil) 100-160-1,000 mg cap Take  by mouth. No current facility-administered medications for this visit.         Review of Systems:  History obtained from the patient and written ROS questionnaire  Constitutional: see HPI  ENT ROS: negative for - hearing change, oral lesions or visual changes  Respiratory: negative for cough, wheezing or dyspnea on exertion  Cardiovascular: negative for chest pain, irregular heart beats, exertional chest pressure/discomfort  Gastrointestinal: se hpi  Genito-Urinary ROS:, see HPI  Inteument/breast: negative for rash, breast lump and nipple discharge  Musculoskeletal:negative for stiff joints, neck pain and muscle weakness  Endocrine ROS: see hpi  Hematological and Lymphatic ROS: negative for - blood clots, bruising or swollen lymph nodes    Physical Exam:  Visit Vitals  /66   Wt 198 lb (89.8 kg)   BMI 31.96 kg/m²       GENERAL: alert, well appearing, and in no distress  HEAD; normocephalic, atraumatic  PULM: clear to auscultation, no wheezes, rales or rhonchi, symmetric air entry   COR: normal rate and regular rhythm, S1 and S2 normal   ABDOMEN: soft, nontender, nondistended, no masses or organomegaly   BACK: normal range of motion, no tenderness, no CVAT   NEURO: alert, oriented, normal speech  EXT no c melba candelaria    See PN flowsheet for additional notes and exam    Assessment:  37 y.o.  10w0d   Encounter Diagnoses   Name Primary?  Nausea Yes    Nausea and vomiting in pregnancy     Hip pain     Antepartum multigravida of advanced maternal age        Plan:  An evaluation of this patient's concern is planned. The patient is advised that she should contact the office if she does not note improvement or if symptoms recur  She should contact our office with any questions or concerns  She could keep her routine OB appointment. Disc discomforts of IUP and safer exercises, PT referral, disc support belt in the future  Dis safer meds in pregnancy for nausea/GERD:   Labs  Notify md if NI  Disc rba of promethazine vs zofran and different side effects  rec counseling for anxiety, ok to take hydroxyzine  Disc safer meds in pregnancy  rec water exercises  Small frequent meals      Orders Placed This Encounter    CBC W/O DIFF    METABOLIC PANEL, COMPREHENSIVE    promethazine (PHENERGAN) 25 mg tablet    ondansetron (ZOFRAN ODT) 4 mg disintegrating tablet       No results found for this visit on 20.     Mary Horner MD

## 2020-07-02 NOTE — Clinical Note
Please send pt referral to pt and notify pt for hip pain in pregnancy, I forgot to do at visit.   Confirm she has FS scheduled at [facility] for AMA (and whatever else on prob list/nob note)

## 2020-07-03 LAB
C TRACH RRNA CVX QL NAA+PROBE: NEGATIVE
CYTOLOGIST CVX/VAG CYTO: NORMAL
CYTOLOGY CVX/VAG DOC CYTO: NORMAL
CYTOLOGY CVX/VAG DOC THIN PREP: NORMAL
DX ICD CODE: NORMAL
HPV I/H RISK 1 DNA CVX QL PROBE+SIG AMP: NEGATIVE
Lab: NORMAL
N GONORRHOEA RRNA CVX QL NAA+PROBE: NEGATIVE
OTHER STN SPEC: NORMAL
STAT OF ADQ CVX/VAG CYTO-IMP: NORMAL

## 2020-07-05 NOTE — PROGRESS NOTES
Normal results, add to prenatal records. We can review in detail with patient at next visit.   Add PIH labs to PL (hgb,plat,cr, ast, alt, ur: prot 24 hr total or ratio) w date   10w3d

## 2020-07-06 LAB
CFTR MUT ANL BLD/T: NORMAL
CLINICAL INFO: NORMAL
COMMENT: 480556: NORMAL
ETHNIC BACKGROUND STATED: NORMAL
GENERAL COMMENTS: NORMAL
GENETIC COUNSELOR, 450001: NORMAL
INDICATION: NORMAL
LAB DIRECTOR NAME PROVIDER: NORMAL
REF LAB TEST METHOD: NORMAL
SMN1 GENE MUT ANL BLD/T: NORMAL
SPECIMEN SOURCE: NORMAL
TEST PERFORMANCE INFO SPEC: NORMAL
TEST PERFORMANCE INFO SPEC: NORMAL

## 2020-07-06 NOTE — PROGRESS NOTES
Normal results, add to prenatal records. We can review in detail with patient at next visit.   Update cf to 80: notify lab asap

## 2020-07-15 ENCOUNTER — TELEPHONE (OUTPATIENT)
Dept: OBGYN CLINIC | Age: 35
End: 2020-07-15

## 2020-07-15 NOTE — PROGRESS NOTES
NIPS low risk  Notify pt if [address] message not read,   Notify pt of gender, if desired.   Add to PL: NIPS- low risk XX  Update PNL  Confirm 20 wk US scheduled: add date and location (JAYA/M)  to PL

## 2020-07-24 ENCOUNTER — PATIENT MESSAGE (OUTPATIENT)
Dept: OBGYN CLINIC | Age: 35
End: 2020-07-24

## 2020-07-24 ENCOUNTER — HOSPITAL ENCOUNTER (OUTPATIENT)
Dept: PERINATAL CARE | Age: 35
Discharge: HOME | End: 2020-07-24
Attending: OBSTETRICS & GYNECOLOGY
Payer: COMMERCIAL

## 2020-07-24 PROCEDURE — 76813 OB US NUCHAL MEAS 1 GEST: CPT | Performed by: OBSTETRICS & GYNECOLOGY

## 2020-07-24 NOTE — PROGRESS NOTES
MFM US/NT-ultrasound component normal.  Update prenatals. Confirm NIPS/1st trimester serum results in chart.   Fu on FS date (this was not fs)

## 2020-07-24 NOTE — PATIENT INSTRUCTIONS
Weeks 10 to 14 of Your Pregnancy: Care Instructions  Your Care Instructions    By weeks 10 to 14 of your pregnancy, the placenta has formed inside your uterus. It is possible to hear your baby's heartbeat with a special ultrasound device. Your baby's eyes can and do move. The arms and legs can bend. This is a good time to think about testing for birth defects. There are two types of tests: screening and diagnostic. Screening tests show the chance that a baby has a certain birth defect. They can't tell you for sure that your baby has a problem. Diagnostic tests show if a baby has a certain birth defect. It's your choice whether to have these tests. You and your partner can talk to your doctor or midwife about birth defects tests. Follow-up care is a key part of your treatment and safety. Be sure to make and go to all appointments, and call your doctor if you are having problems. It's also a good idea to know your test results and keep a list of the medicines you take. How can you care for yourself at home? Decide about tests  · You can have screening tests and diagnostic tests to check for birth defects. The decision to have a test for birth defects is personal. Think about your age, your chance of passing on a family disease, your need to know about any problems, and what you might do after you have the test results. ? Triple or quadruple (quad) blood tests. These screening tests can be done between 15 and 20 weeks of pregnancy. They check the amounts of three or four substances in your blood. The doctor looks at these test results, along with your age and other factors, to find out the chance that your baby may have certain problems. ? Amniocentesis. This diagnostic test is used to look for chromosomal problems in the baby's cells.  It can be done between 15 and 20 weeks of pregnancy, usually around week 16.  ? Nuchal translucency test. This test uses ultrasound to measure the thickness of the area at the back of the baby's neck. An increase in the thickness can be an early sign of Down syndrome. ? Chorionic villus sampling (CVS). This is a test that looks for certain genetic problems with your baby. The same genes that are in your baby are in the placenta. A small piece of the placenta is taken out and tested. This test is done when you are 10 to 13 weeks pregnant. Ease discomfort  · Slow down and take naps when you feel tired. · If your emotions swing, talk to someone. Crying, anxiety, and concentration problems are common. · If your gums bleed, try a softer toothbrush. If your gums are puffy and bleed a lot, see your dentist.  · If you feel dizzy:  ? Get up slowly after sitting or lying down. ? Drink plenty of fluids. ? Eat small snacks to keep your blood sugar stable. ? Put your head between your legs as though you were tying your shoelaces. ? Lie down with your legs higher than your head. Use pillows to prop up your feet. · If you have a headache:  ? Lie down. ? Ask your partner or a good friend for a neck massage. ? Try cool cloths over your forehead or across the back of your neck. ? Use acetaminophen (Tylenol) for pain relief. Do not use nonsteroidal anti-inflammatory drugs (NSAIDs), such as ibuprofen (Advil, Motrin) or naproxen (Aleve), unless your doctor says it is okay. · If you have a nosebleed, pinch your nose gently, and hold it for a short while. To prevent nosebleeds, try massaging a small dab of petroleum jelly, such as Vaseline, in your nostrils. · If your nose is stuffed up, try saline (saltwater) nose sprays. Do not use decongestant sprays. Care for your breasts  · Wear a bra that gives you good support. · Know that changes in your breasts are normal.  ? Your breasts may get larger and more tender. Tenderness usually gets better by 12 weeks. ? Your nipples may get darker and larger, and small bumps around your nipples may show more. ?  The veins in your chest and breasts may show more. · Don't worry about \"toughening'\" your nipples. Breastfeeding will naturally do this. Where can you learn more? Go to http://kourtney-tamy.info/  Enter Z576 in the search box to learn more about \"Weeks 10 to 14 of Your Pregnancy: Care Instructions. \"  Current as of: May 29, 2019Content Version: 12.4  © 8929-3821 [employer]orporated. Care instructions adapted under license by [employer] (which disclaims liability or warranty for this information). If you have questions about a medical condition or this instruction, always ask your healthcare professional. [address] any warranty or liability for your use of this information.

## 2020-07-27 ENCOUNTER — ROUTINE PRENATAL (OUTPATIENT)
Dept: OBGYN CLINIC | Age: 35
End: 2020-07-27

## 2020-07-27 VITALS
SYSTOLIC BLOOD PRESSURE: 104 MMHG | BODY MASS INDEX: 31.02 KG/M2 | HEIGHT: 66 IN | DIASTOLIC BLOOD PRESSURE: 74 MMHG | WEIGHT: 193 LBS

## 2020-07-27 DIAGNOSIS — Z3A.13 13 WEEKS GESTATION OF PREGNANCY: Primary | ICD-10-CM

## 2020-07-27 DIAGNOSIS — O09.529 ANTEPARTUM MULTIGRAVIDA OF ADVANCED MATERNAL AGE: ICD-10-CM

## 2020-07-27 NOTE — PROGRESS NOTES
_ [address] [facility]  [email or web address]  [phone]    Marissa Loza MD, FACOG     Follow-up OB visit    Chief Complaint   Patient presents with   [facility] Routine Prenatal Visit       Patient Active Problem List    Diagnosis Date Noted    Anxiety disorder 2020    Elderly multigravida 2020    H/O:  2020        The patient reports the following concerns: Patient complains of nausea, vomiting and diarrhea for the past 2 days. Patient complains of bilateral back pain and left side hip pain. Patient is currently going to physical therapy. Started one week. Vitals:    20 1000   BP: 104/74   Weight: 193 lb (87.5 kg)   Height: 5' 6\" (1.676 m)     See PN flowsheet for exam    37 y.o.  13w4d   Encounter Diagnoses   Name Primary?  Antepartum multigravida of advanced maternal age    [facility] 17 weeks gestation of pregnancy Yes       Keep PT fu  We reviewed the JAYA delivery consent and it was signed by the patient. We discussed risks and alternatives to trial of labor after  section, including elective repeat  section, ERCD. We also discussed potential risks of pitocin use, including but not limited to uterine rupture. We discussed bleeding, infection, anesthesia risks, damage to internal organs; bladder/bowel/other internal organs, scarring, and additional procedures, if needed. We discussed that blood transfusion may be required in life threatening situations and discussed alternatives. We reviewed potential risks of blood transfusion: including transfusion reactions and infections. The patient consents to transfusion, if necessary. We reviewed success and failure rates of TOLAC, incidence of uterine rupture and factors that increase risks, future reproductive plans and risks of multiple  deliveries, including but not limited to placenta creta. The maternal and  risks with uterine rupture were reviewed.   We discussed  risks associated with TOLAC and ERCD. The risk of peripartum hysterectomy because of uterine rupture or placenta creta, in future pregnancies were reviewed. rec covid screening for n/v/d if any potential risks or if sx persist     [] SAB/bleeding precautions reviewed   [] PTL/PPROM precautions reviewed   [] Labor precautions reviewed   [] Fetal kick counts discussed   [] Labs reviewed with patient   [] [facility] Nightingale precautions reviewed   [] Consent reviewed   [] Handouts given to pt   [] Glucola handout    [] GBS/labor/Magic Hour handout   []    [] We reviewed CDC recommendations for Tdap for patient and close contacts and RBA of receiving in pregnancy, advised obtaining in third trimester   [] Reviewed healthy nutrition in pregnancy and good exercise practices   [] We disc safer medications in pregnancy and referred patient to [facility]A resources   [] We reviewed CDC recommendations for flu vaccine and RBA of receiving in pregnancy   []    []    []       Follow-up and Dispositions    · Return in about 4 weeks (around 2020). No orders of the defined types were placed in this encounter.       Sandhya Peguero MD

## 2020-08-07 ENCOUNTER — TELEPHONE (OUTPATIENT)
Dept: OBGYN CLINIC | Age: 35
End: 2020-08-07

## 2022-03-18 PROBLEM — F41.9 ANXIETY DISORDER: Status: ACTIVE | Noted: 2020-07-02

## 2022-03-18 PROBLEM — Z98.891 H/O: C-SECTION: Status: ACTIVE | Noted: 2020-06-05

## 2022-03-20 PROBLEM — O09.529 ELDERLY MULTIGRAVIDA: Status: ACTIVE | Noted: 2020-06-23

## 2022-08-02 ENCOUNTER — HOSPITAL ENCOUNTER (EMERGENCY)
Age: 37
Discharge: HOME | End: 2022-08-02
Attending: STUDENT IN AN ORGANIZED HEALTH CARE EDUCATION/TRAINING PROGRAM
Payer: COMMERCIAL

## 2022-08-02 VITALS
RESPIRATION RATE: 16 BRPM | OXYGEN SATURATION: 98 % | TEMPERATURE: 98.8 F | HEIGHT: 66 IN | SYSTOLIC BLOOD PRESSURE: 114 MMHG | BODY MASS INDEX: 26.03 KG/M2 | WEIGHT: 162 LBS | HEART RATE: 73 BPM | DIASTOLIC BLOOD PRESSURE: 68 MMHG

## 2022-08-02 DIAGNOSIS — S61.211A LACERATION OF LEFT INDEX FINGER WITHOUT FOREIGN BODY WITHOUT DAMAGE TO NAIL, INITIAL ENCOUNTER: Primary | ICD-10-CM

## 2022-08-02 PROCEDURE — 75810000293 HC SIMP/SUPERF WND  RPR

## 2022-08-02 PROCEDURE — 99282 EMERGENCY DEPT VISIT SF MDM: CPT

## 2022-08-02 NOTE — ED PROVIDER NOTES
47-year-old female with history of anemia presents to the ED with chief complaint of left second finger laceration sustained approximately 40 minutes ago. Patient says she was cutting [employer] [place] toast with a bread knife when this happened. Laceration is to the distal phalanx on the dorsal aspect. I had a bleeding initially but this resolved with pressure. No associated numbness or weakness. Has a very small superficial laceration to the distal third digit as well, no other injuries. She says her last tetanus immunization was within the past 5 years. The history is provided by the patient. Laceration   Pertinent negatives include no numbness and no weakness.       Past Medical History:   Diagnosis Date    Anemia     Anemia     Gallstones 2019    Hypoglycemia     Migraines     Pap smear for cervical cancer screening     Pap smear for cervical cancer screening 2020    Negative, HPV Negative    Vitamin A deficiency     Vitamin D deficiency        Past Surgical History:   Procedure Laterality Date    HX  SECTION  2016;07/15/2009    HX HERNIA REPAIR  2014    IR CHOLECYSTOST[facility]Y PERC[facility]  2019    [facility]         Family History:   Problem Relation Age of Onset    Hypertension Mother     Mult Sclerosis Mother     Stroke Mother     Hypertension Father     COPD Father     Diabetes Father     Breast Cancer Paternal Aunt     Other Son         chiari malformation       Social History     Socioeconomic History    Marital status:      Spouse name: Not on file    Number of children: Not on file    Years of education: Not on file    Highest education level: Not on file   Occupational History    Not on file   Tobacco Use    Smoking status: Never    Smokeless tobacco: Never   Substance and Sexual Activity    Alcohol use: Not Currently    Drug use: Never    Sexual activity: Yes     Partners: Male     Birth control/protection: None   Other Topics Concern    Not on file   Social History Narrative    Not on file     Social Determinants of Health     Financial Resource Strain: Not on file   Food Insecurity: Not on file   Transportation Needs: Not on file   Physical Activity: Not on file   Stress: Not on file   Social Connections: Not on file   Intimate Partner Violence: Not on file   Housing Stability: Not on file         ALLERGIES: Tamiflu [oseltamivir]    Review of Systems   Constitutional:  Negative for chills and fever. HENT:  Negative for congestion and rhinorrhea. Respiratory:  Negative for cough and shortness of breath. Cardiovascular:  Negative for chest pain and leg swelling. Gastrointestinal:  Negative for abdominal pain, constipation, diarrhea, nausea and vomiting. Genitourinary:  Negative for difficulty urinating, dysuria and hematuria. Musculoskeletal:  Negative for back pain and neck pain. Skin:  Positive for wound. Negative for color change and rash. Neurological:  Negative for dizziness, weakness, light-headedness, numbness and headaches. Psychiatric/Behavioral:  Negative for agitation and confusion. Vitals:    08/02/22 1124   BP: 114/68   Pulse: 73   Resp: 16   Temp: 98.8 °F (37.1 °C)   SpO2: 98%   Weight: 73.5 kg (162 lb)   Height: 5' 6\" (1.676 m)            Physical Exam  Constitutional:       General: She is not in acute distress. Appearance: She is well-developed. HENT:      Head: Normocephalic and atraumatic. Eyes:      General: No scleral icterus. Pupils: Pupils are equal, round, and reactive to light. Neck:      Trachea: No tracheal deviation. Cardiovascular:      Rate and Rhythm: Normal rate and regular rhythm. Heart sounds: No murmur heard. No friction rub. No gallop. Pulmonary:      Effort: Pulmonary effort is normal. No respiratory distress. Breath sounds: Normal breath sounds. No wheezing or rales. Abdominal:      General: Bowel sounds are normal. There is no distension. Palpations: Abdomen is soft. Tenderness: no abdominal tenderness   Musculoskeletal:         General: No deformity. Cervical back: Neck supple. Skin:     General: Skin is warm and dry. Comments: Well approximated U-shaped laceration to the distal phalanx of the left second finger. No active bleeding. Neurovascularly intact distally. Also with very small superficial laceration to the lateral aspect of the dorsal distal phalanx of the third finger. No active bleeding. Neurological:      Mental Status: She is alert and oriented to person, place, and time. Psychiatric:         Behavior: Behavior normal.        MDM  Number of Diagnoses or Management Options  Diagnosis management comments: 77-year-old female presenting with finger lacerations. Wound to third finger is very small and does not require repair. Wound to second finger is superficial and well approximated, will plan on repair with Dermabond. Patient is washing out her finger in the sink right now. Patient reports tetanus is up-to-date within the past 5 years. We will plan on discharge after wound repair. Procedures    Procedure Note - Wound Repair:    Performed by Aldo Miller MD .     Immediately prior to the procedure, the patient was reevaluated and found suitable for the planned procedure and any planned medications. Immediately prior to the procedure a time out was called to verify the correct patient, procedure, equipment, staff, and marking as appropriate. Tendon/Joint function was Intact. Neurovascular function was Intact. Anesthesia was not used. Wound irrigated with copious amounts of normal saline and explored. Wound was located on the left index finger, measured 2 cm and was stellate. Level of complexity was: simple. Wound was closed using Dermabond. Foreign body was not suspected. Foreign body was not found. Procedure was tolerated well.     DISCHARGE NOTE:  11:48 AM  The patient has been re-evaluated and feeling much better and are stable for discharge. All available radiology and laboratory results have been reviewed with patient and/or available family. Patient and/or family verbally conveyed their understanding and agreement of the patient's signs, symptoms, diagnosis, treatment and prognosis and additionally agree to follow-up as recommended in the discharge instructions or to return to the Emergency Department should their condition change or worsen prior to their follow-up appointment. All questions have been answered and patient and/or available family express understanding. LABORATORY RESULTS:  No results found for this or any previous visit (from the past 24 hour(s)). IMAGING RESULTS:  No results found. MEDICATIONS GIVEN:  Medications - No data to display    IMPRESSION:  1.  Laceration of left index finger without foreign body without damage to nail, initial encounter        PLAN:  Follow-up Information       Follow up With Specialties Details Why [address]    [facility] EM[facility]GENCY DEPT Emergency Medicine  If symptoms worsen [id number] [address]          Current Discharge Medication List          Signed By: Morgan Prieto MD     August 2, 2022

## 2022-08-02 NOTE — DISCHARGE INSTRUCTIONS
Please return to the emergency room if you have any increased swelling, discharge, or pain from the finger.

## 2022-11-06 ENCOUNTER — HOSPITAL ENCOUNTER (EMERGENCY)
Age: 37
Discharge: HOME | End: 2022-11-06
Attending: EMERGENCY MEDICINE
Payer: COMMERCIAL

## 2022-11-06 ENCOUNTER — APPOINTMENT (OUTPATIENT)
Dept: GENERAL RADIOLOGY | Age: 37
End: 2022-11-06
Attending: EMERGENCY MEDICINE
Payer: COMMERCIAL

## 2022-11-06 VITALS
DIASTOLIC BLOOD PRESSURE: 70 MMHG | HEART RATE: 60 BPM | RESPIRATION RATE: 16 BRPM | HEIGHT: 66 IN | WEIGHT: 180 LBS | OXYGEN SATURATION: 100 % | SYSTOLIC BLOOD PRESSURE: 118 MMHG | TEMPERATURE: 98.7 F | BODY MASS INDEX: 28.93 KG/M2

## 2022-11-06 DIAGNOSIS — D64.9 ANEMIA, UNSPECIFIED TYPE: ICD-10-CM

## 2022-11-06 DIAGNOSIS — R07.9 CHEST PAIN, UNSPECIFIED TYPE: Primary | ICD-10-CM

## 2022-11-06 LAB
ALBUMIN SERPL-MCNC: 3.6 G/DL (ref 3.5–5.2)
ALBUMIN/GLOB SERPL: 1.4 {RATIO} (ref 1.1–2.2)
ALP SERPL-CCNC: 98 U/L (ref 35–104)
ALT SERPL-CCNC: 14 U/L (ref 10–35)
ANION GAP SERPL CALC-SCNC: 8 MMOL/L (ref 5–15)
AST SERPL-CCNC: 21 U/L (ref 10–35)
BASOPHILS # BLD: 0 K/UL (ref 0–0.1)
BASOPHILS NFR BLD: 1 % (ref 0–1)
BILIRUB SERPL-MCNC: 0.7 MG/DL (ref 0.2–1)
BNP SERPL-MCNC: 179 PG/ML
BUN SERPL-MCNC: 15 MG/DL (ref 6–20)
BUN/CREAT SERPL: 31 (ref 12–20)
CALCIUM SERPL-MCNC: 8.6 MG/DL (ref 8.6–10)
CHLORIDE SERPL-SCNC: 109 MMOL/L (ref 98–107)
CO2 SERPL-SCNC: 24 MMOL/L (ref 22–29)
CREAT SERPL-MCNC: 0.49 MG/DL (ref 0.5–0.9)
D DIMER PPP FEU-MCNC: 0.48 UG/ML(FEU)
DIFFERENTIAL METHOD BLD: ABNORMAL
EOSINOPHIL # BLD: 0.2 K/UL (ref 0–0.4)
EOSINOPHIL NFR BLD: 3 % (ref 0–7)
ERYTHROCYTE [DISTWIDTH] IN BLOOD BY AUTOMATED COUNT: 15.9 % (ref 11.5–14.5)
GLOBULIN SER CALC-MCNC: 2.5 G/DL (ref 2–4)
GLUCOSE SERPL-MCNC: 94 MG/DL (ref 65–100)
HCT VFR BLD AUTO: 29.9 % (ref 35–47)
HGB BLD-MCNC: 8.9 G/DL (ref 11.5–16)
IMM GRANULOCYTES # BLD AUTO: 0 K/UL (ref 0–0.04)
IMM GRANULOCYTES NFR BLD AUTO: 0 % (ref 0–0.5)
LYMPHOCYTES # BLD: 1.6 K/UL (ref 0.8–3.5)
LYMPHOCYTES NFR BLD: 28 % (ref 12–49)
MCH RBC QN AUTO: 23.6 PG (ref 26–34)
MCHC RBC AUTO-ENTMCNC: 29.8 G/DL (ref 30–36.5)
MCV RBC AUTO: 79.3 FL (ref 80–99)
MONOCYTES # BLD: 0.5 K/UL (ref 0–1)
MONOCYTES NFR BLD: 8 % (ref 5–13)
NEUTS SEG # BLD: 3.6 K/UL (ref 1.8–8)
NEUTS SEG NFR BLD: 61 % (ref 32–75)
NRBC # BLD: 0 K/UL (ref 0–0.01)
NRBC BLD-RTO: 0 PER 100 WBC
PLATELET # BLD AUTO: 168 K/UL (ref 150–400)
PMV BLD AUTO: 13.6 FL (ref 8.9–12.9)
POTASSIUM SERPL-SCNC: 3.9 MMOL/L (ref 3.5–5.1)
PROT SERPL-MCNC: 6.1 G/DL (ref 6.4–8.3)
RBC # BLD AUTO: 3.77 M/UL (ref 3.8–5.2)
SODIUM SERPL-SCNC: 141 MMOL/L (ref 136–145)
TROPONIN I BLD-MCNC: <0.04 NG/ML (ref 0–0.08)
TROPONIN I BLD-MCNC: <0.04 NG/ML (ref 0–0.08)
WBC # BLD AUTO: 6 K/UL (ref 3.6–11)

## 2022-11-06 PROCEDURE — 93005 ELECTROCARDIOGRAM TRACING: CPT

## 2022-11-06 PROCEDURE — 71045 X-RAY EXAM CHEST 1 VIEW: CPT

## 2022-11-06 PROCEDURE — 83880 ASSAY OF NATRIURETIC PEPTIDE: CPT

## 2022-11-06 PROCEDURE — 85025 COMPLETE CBC W/AUTO DIFF WBC: CPT

## 2022-11-06 PROCEDURE — 85379 FIBRIN DEGRADATION QUANT: CPT

## 2022-11-06 PROCEDURE — 36415 COLL VENOUS BLD VENIPUNCTURE: CPT

## 2022-11-06 PROCEDURE — 80053 COMPREHEN METABOLIC PANEL: CPT

## 2022-11-06 PROCEDURE — 99285 EMERGENCY DEPT VISIT HI MDM: CPT

## 2022-11-06 NOTE — ED PROVIDER NOTES
20-year-old female with chest pain for couple days. Thought maybe she had a pop in her chest related related to her left rib pain when leaning over a cabinet. She has no history of arrhythmias or ischemia. She is otherwise stable in the ER. The history is provided by the patient. Chest Pain (Angina)   This is a new problem. The current episode started more than 2 days ago. The problem has not changed since onset. The problem occurs constantly. The pain is mild. The quality of the pain is described as pressure-like. The pain does not radiate. The symptoms are aggravated by movement. Pertinent negatives include no abdominal pain, no back pain, no diaphoresis, no dizziness, no exertional chest pressure, no fever, no headaches, no irregular heartbeat, no malaise/fatigue, no nausea, no near-syncope, no palpitations, no shortness of breath, no vomiting and no weakness. She has tried nothing for the symptoms. Risk factors include no risk factors. Her past medical history does not include aneurysm, DM or HTN. Pertinent negatives include no cardiac catheterization, no persantine thallium, no stress echo, no exercise treadmill test, no cardiac stents, no angioplasty and no pacemaker.      Past Medical History:   Diagnosis Date    Anemia     Anemia     Gallstones     Hypoglycemia     Migraines     Pap smear for cervical cancer screening     Pap smear for cervical cancer screening 2020    Negative, HPV Negative    Vitamin A deficiency     Vitamin D deficiency        Past Surgical History:   Procedure Laterality Date    HX  SECTION  2016;07/15/2009    HX HERNIA REPAIR  2014    IR CHOLECYSTOSTOMY PERCUTANEOUS  2019    [facility]         Family History:   Problem Relation Age of Onset    Hypertension Mother     Mult Sclerosis Mother     Stroke Mother     Hypertension Father     COPD Father     Diabetes Father     Breast Cancer Paternal Aunt     Other Son         chidennise malformation       Social History     Socioeconomic History    Marital status:      Spouse name: Not on file    Number of children: Not on file    Years of education: Not on file    Highest education level: Not on file   Occupational History    Not on file   Tobacco Use    Smoking status: Never    Smokeless tobacco: Never   Substance and Sexual Activity    Alcohol use: Not Currently    Drug use: Never    Sexual activity: Yes     Partners: Male     Birth control/protection: None   Other Topics Concern    Not on file   Social History Narrative    Not on file     Social Determinants of Health     Financial Resource Strain: Not on file   Food Insecurity: Not on file   Transportation Needs: Not on file   Physical Activity: Not on file   Stress: Not on file   Social Connections: Not on file   Intimate Partner Violence: Not on file   Housing Stability: Not on file         ALLERGIES: Tamiflu [oseltamivir]    Review of Systems   Constitutional:  Negative for chills, diaphoresis, fever and malaise/fatigue. HENT:  Negative for congestion, rhinorrhea, sneezing and sore throat. Respiratory:  Negative for shortness of breath. Cardiovascular:  Positive for chest pain. Negative for palpitations and near-syncope. Gastrointestinal:  Negative for abdominal pain, nausea and vomiting. Musculoskeletal:  Negative for back pain, myalgias and neck stiffness. Skin:  Negative for rash. Neurological:  Negative for dizziness, weakness and headaches. All other systems reviewed and are negative. Vitals:    11/06/22 1019 11/06/22 1034 11/06/22 1037 11/06/22 1052   BP: 131/73 118/67 116/77 121/66   Pulse:       Resp:       Temp:       SpO2: 100% 100% 100% 100%   Weight:       Height:                Physical Exam  Vitals and nursing note reviewed. Constitutional:       Appearance: Normal appearance. She is well-developed. HENT:      Head: Normocephalic and atraumatic.    Eyes:      Conjunctiva/sclera: Conjunctivae normal.   Cardiovascular:      Rate and Rhythm: Normal rate and regular rhythm. Pulses: Normal pulses. Heart sounds: Normal heart sounds, S1 normal and S2 normal.   Pulmonary:      Effort: Pulmonary effort is normal. No respiratory distress. Breath sounds: Normal breath sounds. No wheezing. Abdominal:      General: Bowel sounds are normal. There is no distension. Palpations: Abdomen is soft. Tenderness: There is no abdominal tenderness. There is no rebound. Musculoskeletal:         General: Normal range of motion. Cervical back: Full passive range of motion without pain, normal range of motion and neck supple. Skin:     General: Skin is warm and dry. Findings: No rash. Neurological:      Mental Status: She is alert and oriented to person, place, and time. Psychiatric:         Speech: Speech normal.         Behavior: Behavior normal.         Thought Content: Thought content normal.         Judgment: Judgment normal.        MDM  Number of Diagnoses or Management Options  Anemia, unspecified type  Chest pain, unspecified type  Diagnosis management comments: 27-year-old female with chest pain. Possible rib injury. Possible pneumothorax. Possible angina. Possible arrhythmia. Patient Progress  Patient progress: improved (Work-up is negative including 2 troponins and a D-dimer and a chest x-ray. Patient is suitable for discharged home. )    ED Course as of 11/06/22 1146   Sun Nov 06, 2022   1145 EKG at 909 and read at 918 shows normal sinus rhythm at 62 bpm with no ST elevations or ectopy. [VM]   8305 Did well in the emergency department. Troponin was negative twice at proper interval and she is suitable for discharge home. D-dimer is also negative and her condition is stable.  [VM]      ED Course User Index  [VM] Carlos Sanchez MD     Recent Results (from the past 24 hour(s))   EKG, 12 LEAD, INITIAL    Collection Time: 11/06/22  9:09 AM   Result Value Ref Range Ventricular Rate 62 BPM    Atrial Rate 62 BPM    P-R Interval 148 ms    QRS Duration 92 ms    Q-T Interval 392 ms    QTC Calculation (Bezet) 397 ms    Calculated P Axis 47 degrees    Calculated R Axis 54 degrees    Calculated T Axis 40 degrees    Diagnosis       Normal sinus rhythm  Normal ECG  No previous ECGs available     CBC WITH AUTOMATED DIFF    Collection Time: 11/06/22 10:27 AM   Result Value Ref Range    WBC 6.0 3.6 - 11.0 K/uL    RBC 3.77 (L) 3.80 - 5.20 M/uL    HGB 8.9 (L) 11.5 - 16.0 g/dL    HCT 29.9 (L) 35.0 - 47.0 %    MCV 79.3 (L) 80.0 - 99.0 FL    MCH 23.6 (L) 26.0 - 34.0 PG    MCHC 29.8 (L) 30.0 - 36.5 g/dL    RDW 15.9 (H) 11.5 - 14.5 %    PLATELET 313 356 - 262 K/uL    MPV 13.6 (H) 8.9 - 12.9 FL    NRBC 0.0 0  WBC    ABSOLUTE NRBC 0.00 0.00 - 0.01 K/uL    NEUTROPHILS 61 32 - 75 %    LYMPHOCYTES 28 12 - 49 %    MONOCYTES 8 5 - 13 %    EOSINOPHILS 3 0 - 7 %    BASOPHILS 1 0 - 1 %    IMMATURE GRANULOCYTES 0 0.0 - 0.5 %    ABS. NEUTROPHILS 3.6 1.8 - 8.0 K/UL    ABS. LYMPHOCYTES 1.6 0.8 - 3.5 K/UL    ABS. MONOCYTES 0.5 0.0 - 1.0 K/UL    ABS. EOSINOPHILS 0.2 0.0 - 0.4 K/UL    ABS. BASOPHILS 0.0 0.0 - 0.1 K/UL    ABS. IMM. GRANS. 0.0 0.00 - 0.04 K/UL    DF AUTOMATED     METABOLIC PANEL, COMPREHENSIVE    Collection Time: 11/06/22 10:27 AM   Result Value Ref Range    Sodium 141 136 - 145 mmol/L    Potassium 3.9 3.5 - 5.1 mmol/L    Chloride 109 (H) 98 - 107 mmol/L    CO2 24 22 - 29 mmol/L    Anion gap 8 5 - 15 mmol/L    Glucose 94 65 - 100 mg/dL    BUN 15 6 - 20 MG/DL    Creatinine 0.49 (L) 0.50 - 0.90 MG/DL    BUN/Creatinine ratio 31 (H) 12 - 20      eGFR >60 >60 ml/min/1.73m2    Calcium 8.6 8.6 - 10.0 MG/DL    Bilirubin, total 0.7 0.2 - 1.0 MG/DL    ALT (SGPT) 14 10 - 35 U/L    AST (SGOT) 21 10 - 35 U/L    Alk.  phosphatase 98 35 - 104 U/L    Protein, total 6.1 (L) 6.4 - 8.3 g/dL    Albumin 3.6 3.5 - 5.2 g/dL    Globulin 2.5 2.0 - 4.0 g/dL    A-G Ratio 1.4 1.1 - 2.2     NT-PRO BNP    Collection Time: 11/06/22 10:27 AM   Result Value Ref Range    NT pro- (H) <126 PG/ML   D DIMER    Collection Time: 11/06/22 10:27 AM   Result Value Ref Range    D DIMER 0.48 <0.50 ug/ml(FEU)   POC TROPONIN-I    Collection Time: 11/06/22 10:29 AM   Result Value Ref Range    Troponin-I (POC) <0.04 0.00 - 0.08 ng/mL   POC TROPONIN-I    Collection Time: 11/06/22 11:22 AM   Result Value Ref Range    Troponin-I (POC) <0.04 0.00 - 0.08 ng/mL      XR CHEST PORT   Final Result   1.  No acute disease             Procedures

## 2022-11-06 NOTE — ED TRIAGE NOTES
Pt arrives to ER with c/o left side pain/chest pain that started Tuesday after cleaning. Pt reports she hear a pop noise in her chest and has been hurting since then.

## 2022-11-07 LAB
ATRIAL RATE: 62 BPM
CALCULATED P AXIS, ECG09: 47 DEGREES
CALCULATED R AXIS, ECG10: 54 DEGREES
CALCULATED T AXIS, ECG11: 40 DEGREES
DIAGNOSIS, 93000: NORMAL
P-R INTERVAL, ECG05: 148 MS
Q-T INTERVAL, ECG07: 392 MS
QRS DURATION, ECG06: 92 MS
QTC CALCULATION (BEZET), ECG08: 397 MS
VENTRICULAR RATE, ECG03: 62 BPM

## 2023-07-28 ENCOUNTER — TELEPHONE (OUTPATIENT)
Age: 38
End: 2023-07-28

## 2023-07-28 NOTE — TELEPHONE ENCOUNTER
Pt called in and wanted to be seen for possible revision but wasn't certain that's what she needed. Pt had duodenal switch surgery in 2013 in [place], [place]. Preformed by Dr. Juli Prabhakar (pt stated he is now retired)    Pt states that for about 4 years she has experienced intermittent pain in her abdomen area that happens about 1x weekly. Pt recently est care with [facility]. Pt stated that [facility] provider thinks it could be scar tissue from previous surgery. [facility] will not operate as they do not preform the switch. Pt would like to be seen by either Dr. Genaro Louis or Dr. Duyen Bains to discuss her treatment plan. Would this be scheduled as a new bariatric patient as pt is not certain she wants or needs a revision?

## 2023-08-01 NOTE — TELEPHONE ENCOUNTER
Called pt and sent email with seminar and paperwork and advised that we will contact to schedule when paperwork is returned.  Pt understood and appreciated phone call